# Patient Record
Sex: FEMALE | Race: BLACK OR AFRICAN AMERICAN | Employment: FULL TIME | URBAN - METROPOLITAN AREA
[De-identification: names, ages, dates, MRNs, and addresses within clinical notes are randomized per-mention and may not be internally consistent; named-entity substitution may affect disease eponyms.]

---

## 2021-07-01 ENCOUNTER — APPOINTMENT (OUTPATIENT)
Dept: ULTRASOUND IMAGING | Facility: CLINIC | Age: 26
End: 2021-07-01
Attending: EMERGENCY MEDICINE
Payer: COMMERCIAL

## 2021-07-01 ENCOUNTER — HOSPITAL ENCOUNTER (EMERGENCY)
Facility: CLINIC | Age: 26
Discharge: HOME | End: 2021-07-01
Attending: EMERGENCY MEDICINE | Admitting: EMERGENCY MEDICINE
Payer: COMMERCIAL

## 2021-07-01 ENCOUNTER — HOSPITAL ENCOUNTER (OUTPATIENT)
Facility: CLINIC | Age: 26
Setting detail: OBSERVATION
Discharge: HOME | End: 2021-07-02
Attending: EMERGENCY MEDICINE | Admitting: STUDENT IN AN ORGANIZED HEALTH CARE EDUCATION/TRAINING PROGRAM
Payer: COMMERCIAL

## 2021-07-01 VITALS
HEART RATE: 80 BPM | TEMPERATURE: 99.1 F | SYSTOLIC BLOOD PRESSURE: 136 MMHG | OXYGEN SATURATION: 99 % | BODY MASS INDEX: 28.14 KG/M2 | RESPIRATION RATE: 18 BRPM | HEIGHT: 69 IN | DIASTOLIC BLOOD PRESSURE: 75 MMHG | WEIGHT: 190 LBS

## 2021-07-01 DIAGNOSIS — K59.03 DRUG-INDUCED CONSTIPATION: Primary | ICD-10-CM

## 2021-07-01 DIAGNOSIS — K85.20 ALCOHOL-INDUCED ACUTE PANCREATITIS WITHOUT INFECTION OR NECROSIS: ICD-10-CM

## 2021-07-01 DIAGNOSIS — K85.20 ALCOHOL-INDUCED ACUTE PANCREATITIS, UNSPECIFIED COMPLICATION STATUS: ICD-10-CM

## 2021-07-01 LAB
ALBUMIN SERPL-MCNC: 4 G/DL (ref 3.4–5)
ALBUMIN SERPL-MCNC: 4.1 G/DL (ref 3.4–5)
ALBUMIN UR-MCNC: 100 MG/DL
ALP SERPL-CCNC: 88 U/L (ref 40–150)
ALP SERPL-CCNC: 90 U/L (ref 40–150)
ALT SERPL W P-5'-P-CCNC: 18 U/L (ref 0–50)
ALT SERPL W P-5'-P-CCNC: 18 U/L (ref 0–50)
ANION GAP SERPL CALCULATED.3IONS-SCNC: 6 MMOL/L (ref 3–14)
ANION GAP SERPL CALCULATED.3IONS-SCNC: 9 MMOL/L (ref 3–14)
ANISOCYTOSIS BLD QL SMEAR: SLIGHT
APPEARANCE UR: CLEAR
AST SERPL W P-5'-P-CCNC: 15 U/L (ref 0–45)
AST SERPL W P-5'-P-CCNC: 25 U/L (ref 0–45)
BASOPHILS # BLD AUTO: 0 10E9/L (ref 0–0.2)
BASOPHILS # BLD AUTO: 0.1 10E9/L (ref 0–0.2)
BASOPHILS NFR BLD AUTO: 0.3 %
BASOPHILS NFR BLD AUTO: 1 %
BILIRUB SERPL-MCNC: 0.6 MG/DL (ref 0.2–1.3)
BILIRUB SERPL-MCNC: 0.7 MG/DL (ref 0.2–1.3)
BILIRUB UR QL STRIP: NEGATIVE
BUN SERPL-MCNC: 7 MG/DL (ref 7–30)
BUN SERPL-MCNC: 8 MG/DL (ref 7–30)
CALCIUM SERPL-MCNC: 9.4 MG/DL (ref 8.5–10.1)
CALCIUM SERPL-MCNC: 9.6 MG/DL (ref 8.5–10.1)
CHLORIDE SERPL-SCNC: 104 MMOL/L (ref 94–109)
CHLORIDE SERPL-SCNC: 105 MMOL/L (ref 94–109)
CO2 SERPL-SCNC: 24 MMOL/L (ref 20–32)
CO2 SERPL-SCNC: 26 MMOL/L (ref 20–32)
COLOR UR AUTO: YELLOW
CREAT SERPL-MCNC: 0.54 MG/DL (ref 0.52–1.04)
CREAT SERPL-MCNC: 0.56 MG/DL (ref 0.52–1.04)
DIFFERENTIAL METHOD BLD: ABNORMAL
DIFFERENTIAL METHOD BLD: ABNORMAL
ELLIPTOCYTES BLD QL SMEAR: SLIGHT
EOSINOPHIL # BLD AUTO: 0 10E9/L (ref 0–0.7)
EOSINOPHIL # BLD AUTO: 0 10E9/L (ref 0–0.7)
EOSINOPHIL NFR BLD AUTO: 0 %
EOSINOPHIL NFR BLD AUTO: 0.2 %
ERYTHROCYTE [DISTWIDTH] IN BLOOD BY AUTOMATED COUNT: 18.5 % (ref 10–15)
ERYTHROCYTE [DISTWIDTH] IN BLOOD BY AUTOMATED COUNT: 18.7 % (ref 10–15)
ETHANOL SERPL-MCNC: <0.01 G/DL
GFR SERPL CREATININE-BSD FRML MDRD: >90 ML/MIN/{1.73_M2}
GFR SERPL CREATININE-BSD FRML MDRD: >90 ML/MIN/{1.73_M2}
GLUCOSE SERPL-MCNC: 131 MG/DL (ref 70–99)
GLUCOSE SERPL-MCNC: 155 MG/DL (ref 70–99)
GLUCOSE UR STRIP-MCNC: NEGATIVE MG/DL
HCG SERPL QL: NEGATIVE
HCT VFR BLD AUTO: 34 % (ref 35–47)
HCT VFR BLD AUTO: 34.7 % (ref 35–47)
HGB BLD-MCNC: 9.5 G/DL (ref 11.7–15.7)
HGB BLD-MCNC: 9.6 G/DL (ref 11.7–15.7)
HGB UR QL STRIP: NEGATIVE
IMM GRANULOCYTES # BLD: 0 10E9/L (ref 0–0.4)
IMM GRANULOCYTES NFR BLD: 0.3 %
KETONES UR STRIP-MCNC: NEGATIVE MG/DL
LABORATORY COMMENT REPORT: NORMAL
LEUKOCYTE ESTERASE UR QL STRIP: ABNORMAL
LIPASE SERPL-CCNC: 1627 U/L (ref 73–393)
LIPASE SERPL-CCNC: 2108 U/L (ref 73–393)
LYMPHOCYTES # BLD AUTO: 1.2 10E9/L (ref 0.8–5.3)
LYMPHOCYTES # BLD AUTO: 1.3 10E9/L (ref 0.8–5.3)
LYMPHOCYTES NFR BLD AUTO: 11.4 %
LYMPHOCYTES NFR BLD AUTO: 13 %
MCH RBC QN AUTO: 19.7 PG (ref 26.5–33)
MCH RBC QN AUTO: 20.1 PG (ref 26.5–33)
MCHC RBC AUTO-ENTMCNC: 27.4 G/DL (ref 31.5–36.5)
MCHC RBC AUTO-ENTMCNC: 28.2 G/DL (ref 31.5–36.5)
MCV RBC AUTO: 71 FL (ref 78–100)
MCV RBC AUTO: 72 FL (ref 78–100)
MICROCYTES BLD QL SMEAR: PRESENT
MONOCYTES # BLD AUTO: 0.4 10E9/L (ref 0–1.3)
MONOCYTES # BLD AUTO: 0.8 10E9/L (ref 0–1.3)
MONOCYTES NFR BLD AUTO: 4 %
MONOCYTES NFR BLD AUTO: 7.4 %
MUCOUS THREADS #/AREA URNS LPF: PRESENT /LPF
NEUTROPHILS # BLD AUTO: 8.2 10E9/L (ref 1.6–8.3)
NEUTROPHILS # BLD AUTO: 8.5 10E9/L (ref 1.6–8.3)
NEUTROPHILS NFR BLD AUTO: 80.4 %
NEUTROPHILS NFR BLD AUTO: 82 %
NITRATE UR QL: NEGATIVE
NRBC # BLD AUTO: 0 10*3/UL
NRBC BLD AUTO-RTO: 0 /100
PH UR STRIP: 6 PH (ref 5–7)
PLATELET # BLD AUTO: 270 10E9/L (ref 150–450)
PLATELET # BLD AUTO: 296 10E9/L (ref 150–450)
PLATELET # BLD EST: ABNORMAL 10*3/UL
POTASSIUM SERPL-SCNC: 3.5 MMOL/L (ref 3.4–5.3)
POTASSIUM SERPL-SCNC: 3.6 MMOL/L (ref 3.4–5.3)
PROT SERPL-MCNC: 8.6 G/DL (ref 6.8–8.8)
PROT SERPL-MCNC: 8.9 G/DL (ref 6.8–8.8)
RBC # BLD AUTO: 4.78 10E12/L (ref 3.8–5.2)
RBC # BLD AUTO: 4.82 10E12/L (ref 3.8–5.2)
RBC #/AREA URNS AUTO: 2 /HPF (ref 0–2)
SARS-COV-2 RNA RESP QL NAA+PROBE: NEGATIVE
SODIUM SERPL-SCNC: 136 MMOL/L (ref 133–144)
SODIUM SERPL-SCNC: 138 MMOL/L (ref 133–144)
SOURCE: ABNORMAL
SP GR UR STRIP: 1.02 (ref 1–1.03)
SPECIMEN SOURCE: NORMAL
SQUAMOUS #/AREA URNS AUTO: 14 /HPF (ref 0–1)
TRIGL SERPL-MCNC: 115 MG/DL
UROBILINOGEN UR STRIP-MCNC: 2 MG/DL (ref 0–2)
WBC # BLD AUTO: 10 10E9/L (ref 4–11)
WBC # BLD AUTO: 10.6 10E9/L (ref 4–11)
WBC #/AREA URNS AUTO: 8 /HPF (ref 0–5)

## 2021-07-01 PROCEDURE — 96361 HYDRATE IV INFUSION ADD-ON: CPT

## 2021-07-01 PROCEDURE — 80053 COMPREHEN METABOLIC PANEL: CPT | Performed by: EMERGENCY MEDICINE

## 2021-07-01 PROCEDURE — 250N000011 HC RX IP 250 OP 636: Performed by: STUDENT IN AN ORGANIZED HEALTH CARE EDUCATION/TRAINING PROGRAM

## 2021-07-01 PROCEDURE — 83690 ASSAY OF LIPASE: CPT | Performed by: EMERGENCY MEDICINE

## 2021-07-01 PROCEDURE — 258N000003 HC RX IP 258 OP 636: Performed by: EMERGENCY MEDICINE

## 2021-07-01 PROCEDURE — 99285 EMERGENCY DEPT VISIT HI MDM: CPT | Mod: 25

## 2021-07-01 PROCEDURE — 96376 TX/PRO/DX INJ SAME DRUG ADON: CPT

## 2021-07-01 PROCEDURE — 258N000003 HC RX IP 258 OP 636: Performed by: STUDENT IN AN ORGANIZED HEALTH CARE EDUCATION/TRAINING PROGRAM

## 2021-07-01 PROCEDURE — 85025 COMPLETE CBC W/AUTO DIFF WBC: CPT | Performed by: EMERGENCY MEDICINE

## 2021-07-01 PROCEDURE — 84703 CHORIONIC GONADOTROPIN ASSAY: CPT | Performed by: EMERGENCY MEDICINE

## 2021-07-01 PROCEDURE — 99220 PR INITIAL OBSERVATION CARE,LEVEL III: CPT | Performed by: STUDENT IN AN ORGANIZED HEALTH CARE EDUCATION/TRAINING PROGRAM

## 2021-07-01 PROCEDURE — G0378 HOSPITAL OBSERVATION PER HR: HCPCS

## 2021-07-01 PROCEDURE — 96375 TX/PRO/DX INJ NEW DRUG ADDON: CPT

## 2021-07-01 PROCEDURE — 96374 THER/PROPH/DIAG INJ IV PUSH: CPT

## 2021-07-01 PROCEDURE — C9803 HOPD COVID-19 SPEC COLLECT: HCPCS

## 2021-07-01 PROCEDURE — 84478 ASSAY OF TRIGLYCERIDES: CPT | Performed by: EMERGENCY MEDICINE

## 2021-07-01 PROCEDURE — 81001 URINALYSIS AUTO W/SCOPE: CPT | Performed by: EMERGENCY MEDICINE

## 2021-07-01 PROCEDURE — 87635 SARS-COV-2 COVID-19 AMP PRB: CPT | Performed by: EMERGENCY MEDICINE

## 2021-07-01 PROCEDURE — 250N000011 HC RX IP 250 OP 636: Performed by: EMERGENCY MEDICINE

## 2021-07-01 PROCEDURE — 250N000013 HC RX MED GY IP 250 OP 250 PS 637: Performed by: STUDENT IN AN ORGANIZED HEALTH CARE EDUCATION/TRAINING PROGRAM

## 2021-07-01 PROCEDURE — 76705 ECHO EXAM OF ABDOMEN: CPT

## 2021-07-01 PROCEDURE — 250N000013 HC RX MED GY IP 250 OP 250 PS 637: Performed by: EMERGENCY MEDICINE

## 2021-07-01 PROCEDURE — 82077 ASSAY SPEC XCP UR&BREATH IA: CPT | Performed by: EMERGENCY MEDICINE

## 2021-07-01 RX ORDER — PROCHLORPERAZINE MALEATE 10 MG
10 TABLET ORAL EVERY 6 HOURS PRN
Status: DISCONTINUED | OUTPATIENT
Start: 2021-07-01 | End: 2021-07-02 | Stop reason: HOSPADM

## 2021-07-01 RX ORDER — ACETAMINOPHEN 650 MG/1
650 SUPPOSITORY RECTAL EVERY 4 HOURS PRN
Status: DISCONTINUED | OUTPATIENT
Start: 2021-07-01 | End: 2021-07-02 | Stop reason: HOSPADM

## 2021-07-01 RX ORDER — LANOLIN ALCOHOL/MO/W.PET/CERES
3 CREAM (GRAM) TOPICAL AT BEDTIME
Status: DISCONTINUED | OUTPATIENT
Start: 2021-07-01 | End: 2021-07-02 | Stop reason: HOSPADM

## 2021-07-01 RX ORDER — OXYCODONE HYDROCHLORIDE 5 MG/1
5-10 TABLET ORAL
Status: DISCONTINUED | OUTPATIENT
Start: 2021-07-01 | End: 2021-07-02 | Stop reason: HOSPADM

## 2021-07-01 RX ORDER — ONDANSETRON 4 MG/1
4 TABLET, ORALLY DISINTEGRATING ORAL EVERY 8 HOURS PRN
Qty: 10 TABLET | Refills: 0 | Status: SHIPPED | OUTPATIENT
Start: 2021-07-01 | End: 2021-07-04

## 2021-07-01 RX ORDER — NALOXONE HYDROCHLORIDE 0.4 MG/ML
0.4 INJECTION, SOLUTION INTRAMUSCULAR; INTRAVENOUS; SUBCUTANEOUS
Status: DISCONTINUED | OUTPATIENT
Start: 2021-07-01 | End: 2021-07-02 | Stop reason: HOSPADM

## 2021-07-01 RX ORDER — DIPHENHYDRAMINE HYDROCHLORIDE 50 MG/ML
25 INJECTION INTRAMUSCULAR; INTRAVENOUS ONCE
Status: COMPLETED | OUTPATIENT
Start: 2021-07-01 | End: 2021-07-01

## 2021-07-01 RX ORDER — DIPHENHYDRAMINE HCL 25 MG
25 CAPSULE ORAL ONCE
Status: COMPLETED | OUTPATIENT
Start: 2021-07-01 | End: 2021-07-01

## 2021-07-01 RX ORDER — HYDROMORPHONE HYDROCHLORIDE 1 MG/ML
0.5 INJECTION, SOLUTION INTRAMUSCULAR; INTRAVENOUS; SUBCUTANEOUS ONCE
Status: COMPLETED | OUTPATIENT
Start: 2021-07-01 | End: 2021-07-01

## 2021-07-01 RX ORDER — HYDROMORPHONE HYDROCHLORIDE 1 MG/ML
0.5 INJECTION, SOLUTION INTRAMUSCULAR; INTRAVENOUS; SUBCUTANEOUS
Status: DISCONTINUED | OUTPATIENT
Start: 2021-07-01 | End: 2021-07-01 | Stop reason: HOSPADM

## 2021-07-01 RX ORDER — IBUPROFEN 600 MG/1
600 TABLET, FILM COATED ORAL EVERY 6 HOURS PRN
Status: DISCONTINUED | OUTPATIENT
Start: 2021-07-01 | End: 2021-07-02 | Stop reason: HOSPADM

## 2021-07-01 RX ORDER — HYDROMORPHONE HYDROCHLORIDE 1 MG/ML
.3-.5 INJECTION, SOLUTION INTRAMUSCULAR; INTRAVENOUS; SUBCUTANEOUS
Status: DISCONTINUED | OUTPATIENT
Start: 2021-07-02 | End: 2021-07-02 | Stop reason: HOSPADM

## 2021-07-01 RX ORDER — HYDROXYZINE HYDROCHLORIDE 25 MG/1
50 TABLET, FILM COATED ORAL EVERY 6 HOURS PRN
Status: DISCONTINUED | OUTPATIENT
Start: 2021-07-01 | End: 2021-07-02 | Stop reason: HOSPADM

## 2021-07-01 RX ORDER — ONDANSETRON 2 MG/ML
4 INJECTION INTRAMUSCULAR; INTRAVENOUS ONCE
Status: COMPLETED | OUTPATIENT
Start: 2021-07-01 | End: 2021-07-01

## 2021-07-01 RX ORDER — PANTOPRAZOLE SODIUM 20 MG/1
40 TABLET, DELAYED RELEASE ORAL DAILY
Status: DISCONTINUED | OUTPATIENT
Start: 2021-07-02 | End: 2021-07-02 | Stop reason: HOSPADM

## 2021-07-01 RX ORDER — ONDANSETRON 2 MG/ML
4 INJECTION INTRAMUSCULAR; INTRAVENOUS EVERY 6 HOURS PRN
Status: DISCONTINUED | OUTPATIENT
Start: 2021-07-01 | End: 2021-07-02 | Stop reason: HOSPADM

## 2021-07-01 RX ORDER — NALOXONE HYDROCHLORIDE 0.4 MG/ML
0.2 INJECTION, SOLUTION INTRAMUSCULAR; INTRAVENOUS; SUBCUTANEOUS
Status: DISCONTINUED | OUTPATIENT
Start: 2021-07-01 | End: 2021-07-02 | Stop reason: HOSPADM

## 2021-07-01 RX ORDER — HYDROMORPHONE HYDROCHLORIDE 1 MG/ML
0.5 INJECTION, SOLUTION INTRAMUSCULAR; INTRAVENOUS; SUBCUTANEOUS
Status: DISCONTINUED | OUTPATIENT
Start: 2021-07-01 | End: 2021-07-01

## 2021-07-01 RX ORDER — ONDANSETRON 4 MG/1
4 TABLET, ORALLY DISINTEGRATING ORAL EVERY 6 HOURS PRN
Status: DISCONTINUED | OUTPATIENT
Start: 2021-07-01 | End: 2021-07-02 | Stop reason: HOSPADM

## 2021-07-01 RX ORDER — ONDANSETRON 2 MG/ML
4 INJECTION INTRAMUSCULAR; INTRAVENOUS EVERY 30 MIN PRN
Status: DISCONTINUED | OUTPATIENT
Start: 2021-07-01 | End: 2021-07-01 | Stop reason: HOSPADM

## 2021-07-01 RX ORDER — ACETAMINOPHEN 325 MG/1
650 TABLET ORAL EVERY 4 HOURS PRN
Status: DISCONTINUED | OUTPATIENT
Start: 2021-07-01 | End: 2021-07-02 | Stop reason: HOSPADM

## 2021-07-01 RX ORDER — OXYCODONE HYDROCHLORIDE 5 MG/1
5 TABLET ORAL EVERY 6 HOURS PRN
Qty: 12 TABLET | Refills: 0 | Status: ON HOLD | OUTPATIENT
Start: 2021-07-01 | End: 2021-07-02

## 2021-07-01 RX ORDER — SODIUM CHLORIDE, SODIUM LACTATE, POTASSIUM CHLORIDE, CALCIUM CHLORIDE 600; 310; 30; 20 MG/100ML; MG/100ML; MG/100ML; MG/100ML
INJECTION, SOLUTION INTRAVENOUS CONTINUOUS
Status: DISCONTINUED | OUTPATIENT
Start: 2021-07-01 | End: 2021-07-02 | Stop reason: HOSPADM

## 2021-07-01 RX ORDER — ACETAMINOPHEN 325 MG/1
325-650 TABLET ORAL EVERY 6 HOURS PRN
COMMUNITY

## 2021-07-01 RX ORDER — HYDROXYZINE HYDROCHLORIDE 25 MG/1
25 TABLET, FILM COATED ORAL EVERY 6 HOURS PRN
Status: DISCONTINUED | OUTPATIENT
Start: 2021-07-01 | End: 2021-07-02 | Stop reason: HOSPADM

## 2021-07-01 RX ORDER — PROCHLORPERAZINE 25 MG
25 SUPPOSITORY, RECTAL RECTAL EVERY 12 HOURS PRN
Status: DISCONTINUED | OUTPATIENT
Start: 2021-07-01 | End: 2021-07-02 | Stop reason: HOSPADM

## 2021-07-01 RX ADMIN — MELATONIN 3 MG: 3 TAB ORAL at 21:44

## 2021-07-01 RX ADMIN — HYDROMORPHONE HYDROCHLORIDE 0.5 MG: 1 INJECTION, SOLUTION INTRAMUSCULAR; INTRAVENOUS; SUBCUTANEOUS at 17:27

## 2021-07-01 RX ADMIN — DIPHENHYDRAMINE HYDROCHLORIDE 25 MG: 25 CAPSULE ORAL at 04:42

## 2021-07-01 RX ADMIN — SODIUM CHLORIDE 1000 ML: 9 INJECTION, SOLUTION INTRAVENOUS at 14:15

## 2021-07-01 RX ADMIN — HYDROMORPHONE HYDROCHLORIDE 0.5 MG: 1 INJECTION, SOLUTION INTRAMUSCULAR; INTRAVENOUS; SUBCUTANEOUS at 15:35

## 2021-07-01 RX ADMIN — HYDROMORPHONE HYDROCHLORIDE 0.5 MG: 1 INJECTION, SOLUTION INTRAMUSCULAR; INTRAVENOUS; SUBCUTANEOUS at 21:51

## 2021-07-01 RX ADMIN — SODIUM CHLORIDE, POTASSIUM CHLORIDE, SODIUM LACTATE AND CALCIUM CHLORIDE: 600; 310; 30; 20 INJECTION, SOLUTION INTRAVENOUS at 20:29

## 2021-07-01 RX ADMIN — DIPHENHYDRAMINE HYDROCHLORIDE 25 MG: 50 INJECTION INTRAMUSCULAR; INTRAVENOUS at 03:25

## 2021-07-01 RX ADMIN — ONDANSETRON 4 MG: 2 INJECTION INTRAMUSCULAR; INTRAVENOUS at 12:43

## 2021-07-01 RX ADMIN — IBUPROFEN 600 MG: 600 TABLET ORAL at 21:44

## 2021-07-01 RX ADMIN — HYDROMORPHONE HYDROCHLORIDE 0.5 MG: 1 INJECTION, SOLUTION INTRAMUSCULAR; INTRAVENOUS; SUBCUTANEOUS at 19:34

## 2021-07-01 RX ADMIN — DIPHENHYDRAMINE HYDROCHLORIDE 25 MG: 50 INJECTION INTRAMUSCULAR; INTRAVENOUS at 20:29

## 2021-07-01 RX ADMIN — ONDANSETRON 4 MG: 2 INJECTION INTRAMUSCULAR; INTRAVENOUS at 03:04

## 2021-07-01 RX ADMIN — HYDROMORPHONE HYDROCHLORIDE 1 MG: 1 INJECTION, SOLUTION INTRAMUSCULAR; INTRAVENOUS; SUBCUTANEOUS at 14:17

## 2021-07-01 RX ADMIN — SODIUM CHLORIDE 1000 ML: 9 INJECTION, SOLUTION INTRAVENOUS at 03:04

## 2021-07-01 RX ADMIN — DIPHENHYDRAMINE HYDROCHLORIDE 25 MG: 50 INJECTION INTRAMUSCULAR; INTRAVENOUS at 15:36

## 2021-07-01 RX ADMIN — ONDANSETRON 4 MG: 2 INJECTION INTRAMUSCULAR; INTRAVENOUS at 14:15

## 2021-07-01 RX ADMIN — HYDROMORPHONE HYDROCHLORIDE 0.5 MG: 1 INJECTION, SOLUTION INTRAMUSCULAR; INTRAVENOUS; SUBCUTANEOUS at 04:32

## 2021-07-01 RX ADMIN — HYDROMORPHONE HYDROCHLORIDE 0.5 MG: 1 INJECTION, SOLUTION INTRAMUSCULAR; INTRAVENOUS; SUBCUTANEOUS at 03:05

## 2021-07-01 ASSESSMENT — ENCOUNTER SYMPTOMS
ABDOMINAL PAIN: 1
VOMITING: 1
VOMITING: 1
ABDOMINAL PAIN: 1
CHILLS: 0
NAUSEA: 1
BACK PAIN: 0
APPETITE CHANGE: 1
FEVER: 0
FEVER: 0

## 2021-07-01 ASSESSMENT — MIFFLIN-ST. JEOR
SCORE: 1656.21
SCORE: 1656.21
SCORE: 1693.38

## 2021-07-01 NOTE — PHARMACY-ADMISSION MEDICATION HISTORY
Pharmacy Medication History  Admission medication history interview status for the 7/1/2021  admission is complete. See EPIC admission navigator for prior to admission medications     Location of Interview: Patient room  Medication history sources: Patient    Significant changes made to the medication list:  Added acetaminophen, omeprazole    In the past week, patient estimated taking medication this percent of the time: 50-90% due to illness    Additional medication history information:   None    Medication reconciliation completed by provider prior to medication history? No    Time spent in this activity: 5 minutes    Prior to Admission medications    Medication Sig Last Dose Taking? Auth Provider   acetaminophen (TYLENOL) 325 MG tablet Take 325-650 mg by mouth every 6 hours as needed for mild pain  Yes Unknown, Entered By History   omeprazole (PRILOSEC) 20 MG DR capsule Take 20 mg by mouth daily Past Week at Unknown time Yes Unknown, Entered By History   ondansetron (ZOFRAN ODT) 4 MG ODT tab Take 1 tablet (4 mg) by mouth every 8 hours as needed for nausea  Yes Cordell Bacon MD   oxyCODONE (ROXICODONE) 5 MG tablet Take 1 tablet (5 mg) by mouth every 6 hours as needed for pain  Yes Cordell Bacon MD       The information provided in this note is only as accurate as the sources available at the time of update(s)

## 2021-07-01 NOTE — ED PROVIDER NOTES
"  History   Chief Complaint:  Abdominal pain        The history is provided by the patient.      Chet Mccann is a 26 year old female with history of Pancreatitis secondary to alcohol abuse who presents with abdominal pain. Symptoms have been ongoing for three days. Notes associated nausea, vomiting, and loss of appetite. Denies fever and chills. Symptoms are exacerbated by drinking. She took Tylenol without relief. Notes past similar episodes. Denies possibility of pregnancy.  Denies past medical history of gallbladder problems.    Review of Systems   Constitutional: Positive for appetite change. Negative for chills and fever.   Gastrointestinal: Positive for abdominal pain, nausea and vomiting.   All other systems reviewed and are negative.    Allergies:  Morphine   Fentanyl     Medications:  None     Past Medical History:    Pancreatitis secondary to alcohol abuse    Social History:  Presents alone.  She is from [place] visiting a friend.     Physical Exam     Patient Vitals for the past 24 hrs:   BP Temp Temp src Pulse Resp SpO2 Height Weight   07/01/21 0500 123/78 -- -- 71 -- -- -- --   07/01/21 0447 -- -- -- -- -- 99 % -- --   07/01/21 0430 130/64 -- -- 80 -- -- -- --   07/01/21 0325 -- -- -- -- -- 100 % -- --   07/01/21 0324 127/82 -- -- 78 -- -- -- --   07/01/21 0247 (!) 140/91 99.1  F (37.3  C) Oral 97 18 99 % 1.753 m (5' 9\") 86.2 kg (190 lb)   07/01/21 0245 (!) 140/91 -- -- 97 -- -- -- --       Physical Exam  General: Patient is awake, alert and interactive when I enter the room. Appears uncomfortable  Head: The scalp, face, and head appear normal  Eyes: The pupils are equal, round, and reactive to light. Conjunctivae and sclerae are normal  Neck: Normal range of motion. No anterior cervical lymphadenopathy noted  CV: Regular rate.   Resp: Lungs are clear without wheezes or rales. No respiratory distress.   GI: Abdomen is soft, no rigidity. No evidence of pulsatile mass. No fluid waves or evidence of " ascites. No distension. She is tender to palpation in the epigastric There is no rebound or guarding.    MS: Normal tone. Joints grossly normal without effusions. No asymmetric leg swelling, calf or thigh tenderness.    Skin: No rash or lesions noted. Normal capillary refill noted  Neuro: Speech is normal and fluent. Face is symmetric. Moving all extremities.   Psych:  Normal affect.  Appropriate interactions.      Emergency Department Course     Laboratory:  CBC: WBC 10.0, HGB 9.6 (L) ,    CMP: Glucose 155 (H) Creatinine 0.56 o/w WNL  UA with microscopic: Protein Albumin 100, leukocyte esterase trace, WBC/HPF 8 (H) , squamous epithelial/HPF 14 (H) , mucous present o/w WNL  Lipase: 2,108    HCG quantitative blood: Negative      Emergency Department Course:    Reviewed:  I reviewed nursing notes and vitals    Assessments:  0255 I obtained history and examined the patient as noted above.   0355 I rechecked the patient and explained findings.   0430 Patient still in pain. She is able to tolerate some ice chips.  0530 I rechecked the patient.    Interventions:  0304 Zofran 4 mg IV   0304 NS, 1 L, IV  0305 Dilaudid 0.5mg IV   0325 Benadryl 25mg IV   0432 Dilaudid 0.5mg IV   0442 Benadryl 25mg Oral     Disposition:  The patient was discharged to home.     Impression & Plan     Medical Decision Making:  Chet Mccann is a 26 year old female who presents with epigastric abdominal pain.  The differential diagnosis would include GERD, GIB, esophageal spasm, atypical cardiac sx's, pancreatitis, biliary colic or gallstone disease, AAA, gastroenteritis, gastritis, large vs small bowel disease, etc.  Based on history, PE and labs, the most likely explanation is pancreatitis.      Abdominal exam is benign at this point. Pain improved with the aforementioned interventions. Patient has well known history of pancreatitis, no indication for advanced imaging. Offered admission but she declined. Discussed return precautions.      Covid-19  Chet Mccann was evaluated during a global COVID-19 pandemic, which necessitated consideration that the patient might be at risk for infection with the SARS-CoV-2 virus that causes COVID-19.   Applicable protocols for evaluation were followed during the patient's care.     Diagnosis:    ICD-10-CM    1. Alcohol-induced acute pancreatitis, unspecified complication status  K85.20        Discharge Medications:  New Prescriptions    ONDANSETRON (ZOFRAN ODT) 4 MG ODT TAB    Take 1 tablet (4 mg) by mouth every 8 hours as needed for nausea    OXYCODONE (ROXICODONE) 5 MG TABLET    Take 1 tablet (5 mg) by mouth every 6 hours as needed for pain       Scribe Disclosure:  IArnaldo, am serving as a scribe at 2:44 AM on 7/1/2021 to document services personally performed by  Cordell Bacon MD based on my observations and the provider's statements to me.        Cordell Bacon MD  07/01/21 2159

## 2021-07-01 NOTE — ED NOTES
Pt reports that pain is improved slightly after dilaudid (now 5/10) but does have some itching. Benadryl given. Warm blanket and slippers given to pt.

## 2021-07-01 NOTE — ED TRIAGE NOTES
Patient was discharged from the ER early this am for pancreatitis, she is unable to manage her pain and nausea at home with oxycodone and zofran.

## 2021-07-01 NOTE — ED PROVIDER NOTES
"  History   Chief Complaint:  Abdominal Pain     HPI   Chet Mccann is a 26 year old female with history of pancreatitis secondary to alcohol abuse who presents with 3 days of abdominal pain. The pain does not radiate to her back. She was seen in the ED earlier today and was placed on Zofran and oxycodone and discharged. However, she has been unable to control her pain at home. Her last dose of medications was approximately 5 hours prior to arrival. Since then, she has been unable to manage PO intake due to persistent vomiting. She denies fever and hematemesis. Her last drink was 4 days ago.     Review of Systems   Constitutional: Negative for fever.   Gastrointestinal: Positive for abdominal pain and vomiting.   Musculoskeletal: Negative for back pain.   All other systems reviewed and are negative.    Allergies:  Morphine     Medications:  Zofran  Roxicodone     Past Medical History:    Alcohol abuse  Adrenal hemorrhage  Anemia  Hepatic steatosis  H. Pylori infection  Pancreatitis  GERD     Social History:  Presents to the ED alone   Alcohol use: yes    Physical Exam     Patient Vitals for the past 24 hrs:   BP Temp Pulse Resp SpO2 Height Weight   07/01/21 1231 (!) 142/87 97.7  F (36.5  C) 94 18 100 % 1.753 m (5' 9\") 86.2 kg (190 lb)     Physical Exam  Eyes:               Sclera white; Pupils are equal and round  ENT:                External ears and nares normal  CV:                  Rate as above with regular rhythm   Resp:               Breath sounds clear and equal bilaterally                          Non-labored, no retractions or accessory muscle use  GI:                   Abdomen is soft, epigastric tenderness, doubled over crying in pain                          No rebound tenderness or peritoneal features  MS:                  Moves all extremities  Skin:                Warm and dry  Neuro:             Speech is normal and fluent. No apparent deficit.    Emergency Department Course   Imaging:  US Abdomen " limited RUQ  IMPRESSION:   1.  Mild diffuse fatty infiltration of the liver.   2.  Otherwise unremarkable limited abdominal ultrasound  Reading per radiology    Laboratory:  CBC: WBC 10.6, HGB 9.5 (L),    CMP: glucose 131 (H), protein total 8.9 (H) o/w WNL (Creatinine 0.54)     Triglycerides: 115  Lipase: 1,627 (H)    Alcohol ethyl: <0.01    Asymptomatic COVID19 Virus PCR by nasopharyngeal swab negative     Emergency Department Course:  Reviewed:  I reviewed nursing notes, vitals, past medical history and care everywhere    Assessments:  1356 I obtained history and examined the patient as noted above.   1519 I rechecked the patient and explained findings. She is laying more calmly in bed.      Consults:   1704 I spoke to Dr. Bustos of the hospitalist service.     Interventions:  1243 Zofran, 4 mg, IV  1415 Zofran, 4 mg, IV  1417 Dilaudid, 1 mg, IV  1535 Dilaudid, 0.5 mg, IV  1536 Benadryl, 25 mg, IV  1727 Dilaudid 0.5mg IV injection  1934 Dilaudid 0.5mg IV injection    Disposition:  The patient was admitted to the hospital under the care of Dr. Bustos.     Impression & Plan   Medical Decision Making:  Chet Mccann is a 26 year old female who presents to the emergency department today for evaluation of uncontrolled pain and vomiting after a diagnosis earlier this morning of acute pancreatitis. It has been over 12 hours and blood work was rechecked that shows no developing signs of necrosis, severe electrolyte abnormalities, or sepsis. Ultrasound and triglycerides were not recently checked upon chart review. These both came back normal. She received symptomatic medications and admission was arranged.     Covid-19  Chet Mccann was evaluated during a global COVID-19 pandemic, which necessitated consideration that the patient might be at risk for infection with the SARS-CoV-2 virus that causes COVID-19.   Applicable protocols for evaluation were followed during the patient's care. COVID-19 was considered as part  of the patient's evaluation. The plan for testing is: a test was obtained during this visit.    Diagnosis:    ICD-10-CM    1. Alcohol-induced acute pancreatitis without infection or necrosis  K85.20      Scribe Disclosure:  I, Caroline Hall, am serving as a scribe at 1:55 PM on 7/1/2021 to document services personally performed by Chasidy Justin MD based on my observations and the provider's statements to me.              Chasidy Justin MD  07/01/21 2001

## 2021-07-01 NOTE — H&P
[facility]    History and Physical - Hospitalist Service       Date of Admission:  7/1/2021    Assessment & Plan      Chet Mccann is a 26 year old female with past medical history significant for alcohol use disorder, with prior episodes of pancreatitis, admitted on 7/1/2021 with abdominal pain, found to have acute pancreatitis.     Alcoholic pancreatitis  Pt presents to the ED with 3 days of abdominal pain. She reports associated nausea and vomiting. She has a hx of alcohol induced pancreatitis. Lipase was elevated to 2,108. US of the abdomen was obtained which showed mild diffuse fatty infiltration of the liver, but was otherwise unremarkable.   She attempted to discharge home, but had to return to the ED due to on-going severe pain.   - NPO   - Continue Maintenance fluids   - Pain and nausea control PRN   - Discussed alcohol cessation    Alcohol Use disorder   Pt is vague about how much alcohol she is consumes. She reported drinking some wine and a few shots this weekend. Denies daily drinking. Denies hx of withdrawal.   - Chem dep consultation  - Monitor for signs and symptoms of withdrawal     Hepatic steatosis   Noted on US. LFTs currently wnl    Microcytic Anemia    Hgb is 9.5. This is near her baseline, no active bleeding   - Continue to monitor, outpatient follow-up         Diet: NPO   DVT Prophylaxis: Pneumatic Compression Devices  Denney Catheter: Not present  Central Lines: None  Code Status:Full code     Disposition Plan   Expected discharge: 2 - 3 days, recommended to prior living arrangement once adequate pain management/ tolerating PO medications.     The patient's care was discussed with the Patient.    Antoinette Bustos MD  [facility]  Securely message with the Vocera Web Console (learn more here)  Text page via [employer] Paging/Directory      ______________________________________________________________________    Chief Complaint   Abdominal pain      History is obtained from the patient    History of Present Illness   Chet Mccann is a 26 year old female who presents to the ED with abdominal pain. She was initially seen in the ED early this morning and was diagnosed with pancreatitis. She was sent home with oral pain medications, but continued to have severe pain and could not tolerate the pills so returned to the ED this afternoon. She continues to have severe pain in the epigastrium which radiates to her back. She has not been able to tolerate PO intake. She is restless and tearful due to the pain. She reports drinking some wine and shots this weekend, but denies daily alcohol use. No hx of withdrawal.     Review of Systems    The 10 point Review of Systems is negative other than noted in the HPI or here.     Past Medical History    I have reviewed this patient's medical history and updated it with pertinent information if needed.   Past Medical History:   Diagnosis Date     Alcohol use      Alcohol-induced acute pancreatitis      Anemia      Hepatic steatosis      Obesity        Past Surgical History   I have reviewed this patient's surgical history and updated it with pertinent information if needed.  No past surgical history on file.    Social History   I have reviewed this patient's social history and updated it with pertinent information if needed.  Social History     Tobacco Use     Smoking status: Not on file   Substance Use Topics     Alcohol use: Not on file     Drug use: Not on file       Family History   Reviewed in Care Everywhere, non-contributory     Prior to Admission Medications   Prior to Admission Medications   Prescriptions Last Dose Informant Patient Reported? Taking?   acetaminophen (TYLENOL) 325 MG tablet   Yes Yes   Sig: Take 325-650 mg by mouth every 6 hours as needed for mild pain   omeprazole (PRILOSEC) 20 MG DR capsule Past Week at Unknown time  Yes Yes   Sig: Take 20 mg by mouth daily   ondansetron (ZOFRAN ODT) 4 MG ODT tab   No  Yes   Sig: Take 1 tablet (4 mg) by mouth every 8 hours as needed for nausea   oxyCODONE (ROXICODONE) 5 MG tablet   No Yes   Sig: Take 1 tablet (5 mg) by mouth every 6 hours as needed for pain      Facility-Administered Medications: None     Allergies   Allergies   Allergen Reactions     Morphine Itching       Physical Exam   Vital Signs: Temp: 97.7  F (36.5  C)   BP: (!) 142/87 Pulse: 94   Resp: 18 SpO2: 100 %      Weight: 190 lbs 0 oz  Constitutional: Awake, alert, cooperative, distressed, tearful, writhing around in pain  Eyes: Conjunctiva and pupils examined and normal.  HEENT: Moist mucous membranes, normal dentition.  Respiratory: Clear to auscultation bilaterally, no crackles or wheezing.  Cardiovascular: Regular rate and rhythm, normal S1 and S2, and no murmur noted.  GI: Soft, non-distended, tender in the epigastrium, normal bowel sounds.  Skin: No rashes, no cyanosis, no edema.  Musculoskeletal: No joint swelling, erythema or tenderness.  Neurologic: Cranial nerves 2-12 intact, normal strength and sensation.  Psychiatric: Alert, oriented to person, place and time, tearful, clearly in pain, anxious       Data   Data reviewed today: I reviewed all medications, new labs and imaging results over the last 24 hours.    Recent Labs   Lab 07/01/21  1240 07/01/21  0300   WBC 10.6 10.0   HGB 9.5* 9.6*   MCV 72* 71*    296    136   POTASSIUM 3.6 3.5   CHLORIDE 105 104   CO2 24 26   BUN 7 8   CR 0.54 0.56   ANIONGAP 9 6   JODI 9.4 9.6   * 155*   ALBUMIN 4.1 4.0   PROTTOTAL 8.9* 8.6   BILITOTAL 0.7 0.6   ALKPHOS 88 90   ALT 18 18   AST 25 15   LIPASE 1,627* 2,108*     Recent Results (from the past 24 hour(s))   US Abdomen Limited (RUQ)    Narrative    US ABDOMEN LIMITED 7/1/2021 3:19 PM    CLINICAL HISTORY: Abdominal pain. Pancreatitis.  TECHNIQUE: Limited abdominal ultrasound.  COMPARISON: None.    FINDINGS:  GALLBLADDER: The gallbladder is unremarkable. No gallstones, wall  thickening, or  pericholecystic fluid. Negative sonographic Garcia's  sign.    BILE DUCTS: There is no biliary dilatation. The common duct measures 5  mm.     LIVER: Mild diffuse fatty infiltration of the liver. No focal hepatic  masses.    RIGHT KIDNEY: Unremarkable. No hydronephrosis.    PANCREAS: The visualized portions of the pancreas are unremarkable.    No ascites.      Impression    IMPRESSION:  1.  Mild diffuse fatty infiltration of the liver.  2.  Otherwise unremarkable limited abdominal ultrasound.    JOSH SHIELDS MD          SYSTEM ID:  [id number]

## 2021-07-01 NOTE — ED NOTES
"[facility]  ED Nurse Handoff Report    ED Chief complaint: Abdominal Pain      ED Diagnosis:   Final diagnoses:   None       Code Status: Full Code    Allergies:   Allergies   Allergen Reactions     Morphine Itching       Patient Story: Patient was in ED this am with severe abdominal pain. Discharged home with oxycodone and zofran. Patient returned today because her pain is uncontrolled at home.  Focused Assessment:  Patient states her abdomen hurts.    Treatments and/or interventions provided: Zofran. Dilaudid.  Patient's response to treatments and/or interventions: Some relief.    To be done/followed up on inpatient unit:  See orders.    Does this patient have any cognitive concerns?: None.    Activity level - Baseline/Home:  Independent  Activity Level - Current:   Independent    Patient's Preferred language: English   Needed?: No    Isolation: None  Infection: Not Applicable  Patient tested for COVID 19 prior to admission: YES  Bariatric?: No    Vital Signs:   Vitals:    07/01/21 1231   BP: (!) 142/87   Pulse: 94   Resp: 18   Temp: 97.7  F (36.5  C)   SpO2: 100%   Weight: 86.2 kg (190 lb)   Height: 1.753 m (5' 9\")       Cardiac Rhythm:     Was the PSS-3 completed:   Yes  What interventions are required if any?               Family Comments: N/A  OBS brochure/video discussed/provided to patient/family: No              Name of person given brochure if not patient: N/A              Relationship to patient: N/A    For the majority of the shift this patient's behavior was Green.   Behavioral interventions performed were None.    ED NURSE PHONE NUMBER: [phone]         "

## 2021-07-02 VITALS
SYSTOLIC BLOOD PRESSURE: 135 MMHG | HEART RATE: 71 BPM | BODY MASS INDEX: 29.36 KG/M2 | WEIGHT: 198.19 LBS | OXYGEN SATURATION: 100 % | TEMPERATURE: 97.7 F | HEIGHT: 69 IN | RESPIRATION RATE: 18 BRPM | DIASTOLIC BLOOD PRESSURE: 82 MMHG

## 2021-07-02 LAB
ANION GAP SERPL CALCULATED.3IONS-SCNC: 7 MMOL/L (ref 3–14)
BUN SERPL-MCNC: 5 MG/DL (ref 7–30)
CALCIUM SERPL-MCNC: 8.9 MG/DL (ref 8.5–10.1)
CHLORIDE SERPL-SCNC: 105 MMOL/L (ref 94–109)
CO2 SERPL-SCNC: 24 MMOL/L (ref 20–32)
CREAT SERPL-MCNC: 0.62 MG/DL (ref 0.52–1.04)
GFR SERPL CREATININE-BSD FRML MDRD: >90 ML/MIN/{1.73_M2}
GLUCOSE SERPL-MCNC: 97 MG/DL (ref 70–99)
LIPASE SERPL-CCNC: 467 U/L (ref 73–393)
POTASSIUM SERPL-SCNC: 3.6 MMOL/L (ref 3.4–5.3)
SODIUM SERPL-SCNC: 136 MMOL/L (ref 133–144)
TRIGL SERPL-MCNC: 112 MG/DL

## 2021-07-02 PROCEDURE — 258N000003 HC RX IP 258 OP 636: Performed by: STUDENT IN AN ORGANIZED HEALTH CARE EDUCATION/TRAINING PROGRAM

## 2021-07-02 PROCEDURE — 83690 ASSAY OF LIPASE: CPT | Performed by: STUDENT IN AN ORGANIZED HEALTH CARE EDUCATION/TRAINING PROGRAM

## 2021-07-02 PROCEDURE — G0378 HOSPITAL OBSERVATION PER HR: HCPCS

## 2021-07-02 PROCEDURE — 99217 PR OBSERVATION CARE DISCHARGE: CPT | Performed by: INTERNAL MEDICINE

## 2021-07-02 PROCEDURE — 84478 ASSAY OF TRIGLYCERIDES: CPT | Performed by: STUDENT IN AN ORGANIZED HEALTH CARE EDUCATION/TRAINING PROGRAM

## 2021-07-02 PROCEDURE — 96376 TX/PRO/DX INJ SAME DRUG ADON: CPT

## 2021-07-02 PROCEDURE — 36415 COLL VENOUS BLD VENIPUNCTURE: CPT | Performed by: STUDENT IN AN ORGANIZED HEALTH CARE EDUCATION/TRAINING PROGRAM

## 2021-07-02 PROCEDURE — 250N000013 HC RX MED GY IP 250 OP 250 PS 637: Performed by: STUDENT IN AN ORGANIZED HEALTH CARE EDUCATION/TRAINING PROGRAM

## 2021-07-02 PROCEDURE — 99207 PR APP CREDIT; MD BILLING SHARED VISIT: CPT | Performed by: PHYSICIAN ASSISTANT

## 2021-07-02 PROCEDURE — 250N000011 HC RX IP 250 OP 636: Performed by: STUDENT IN AN ORGANIZED HEALTH CARE EDUCATION/TRAINING PROGRAM

## 2021-07-02 PROCEDURE — 80048 BASIC METABOLIC PNL TOTAL CA: CPT | Performed by: STUDENT IN AN ORGANIZED HEALTH CARE EDUCATION/TRAINING PROGRAM

## 2021-07-02 RX ORDER — AMOXICILLIN 250 MG
1 CAPSULE ORAL DAILY
Qty: 30 TABLET | Refills: 0 | Status: SHIPPED | OUTPATIENT
Start: 2021-07-02 | End: 2021-07-02

## 2021-07-02 RX ORDER — OXYCODONE HYDROCHLORIDE 5 MG/1
5 TABLET ORAL EVERY 4 HOURS PRN
Qty: 8 TABLET | Refills: 0 | Status: SHIPPED | OUTPATIENT
Start: 2021-07-02

## 2021-07-02 RX ORDER — IBUPROFEN 600 MG/1
600 TABLET, FILM COATED ORAL EVERY 6 HOURS PRN
COMMUNITY
Start: 2021-07-02

## 2021-07-02 RX ORDER — AMOXICILLIN 250 MG
1 CAPSULE ORAL DAILY
Qty: 30 TABLET | Refills: 0 | Status: SHIPPED | OUTPATIENT
Start: 2021-07-02

## 2021-07-02 RX ADMIN — PANTOPRAZOLE SODIUM 40 MG: 20 TABLET, DELAYED RELEASE ORAL at 07:52

## 2021-07-02 RX ADMIN — OXYCODONE HYDROCHLORIDE 5 MG: 5 TABLET ORAL at 08:21

## 2021-07-02 RX ADMIN — IBUPROFEN 600 MG: 600 TABLET ORAL at 04:20

## 2021-07-02 RX ADMIN — OXYCODONE HYDROCHLORIDE 10 MG: 5 TABLET ORAL at 03:08

## 2021-07-02 RX ADMIN — SODIUM CHLORIDE, POTASSIUM CHLORIDE, SODIUM LACTATE AND CALCIUM CHLORIDE: 600; 310; 30; 20 INJECTION, SOLUTION INTRAVENOUS at 04:17

## 2021-07-02 RX ADMIN — HYDROMORPHONE HYDROCHLORIDE 0.5 MG: 1 INJECTION, SOLUTION INTRAMUSCULAR; INTRAVENOUS; SUBCUTANEOUS at 01:10

## 2021-07-02 RX ADMIN — IBUPROFEN 600 MG: 600 TABLET ORAL at 10:22

## 2021-07-02 NOTE — PROGRESS NOTES
Observation goals PRIOR TO DISCHARGE    Comments:   -diagnostic tests and consults completed and resulted -Partially met  -vital signs normal or at patient baseline-met   -tolerating oral intake to maintain hydration -Not met  -adequate pain control on oral analgesics -Partially met  -returns to baseline functional status -Partially met  -safe disposition plan has been identified -Not met  Nurse to notify provider when observation goals have been met and patient is ready for discharge.

## 2021-07-02 NOTE — PLAN OF CARE
Pt A&O X4. VSS on RA. Up SBA/IND. NPO except for ice chips and Meds. IVF LR @125 ML/HR. Abdominal pain managed with PRN dilaudid and ibuprofen. Possible discharge 2-3 days once adequate pain managed  with PO medications. Continue to monitor.     Observation goals PRIOR TO DISCHARGE    Comments:   -diagnostic tests and consults completed and resulted -Partially met  -vital signs normal or at patient baseline-met   -tolerating oral intake to maintain hydration -Not met  -adequate pain control on oral analgesics -Partially met  -returns to baseline functional status -Partially met  -safe disposition plan has been identified -Not met  Nurse to notify provider when observation goals have been met and patient is ready for discharge.

## 2021-07-02 NOTE — PROVIDER NOTIFICATION
MD Notification    Notified Person: MD    Notified Person Name: Dr. Akash Curry    Notification Date/Time:    Notification Interaction:    Purpose of Notification: OBS 29 SG  pt is requesting IV BENADRYL  Please advise  Thank you  Susan ROMERO    Orders Received:    Comments:

## 2021-07-02 NOTE — PROGRESS NOTES
Observation goals PRIOR TO DISCHARGE      -diagnostic tests and consults completed and resulted -Partially met  -vital signs normal or at patient baseline-met   -tolerating oral intake to maintain hydration -Not met  -adequate pain control on oral analgesics - Met  -returns to baseline functional status - Met  -safe disposition plan has been identified -Not met    Nurse to notify provider when observation goals have been met and patient is ready for discharge.

## 2021-07-02 NOTE — DISCHARGE SUMMARY
[facility]  Hospitalist Discharge Summary      Date of Admission:  7/1/2021  Date of Discharge:  7/2/2021  Discharging Provider: Bella Abdul PA-C    Discharge Diagnoses   Alcoholic pancreatitis     Follow-ups Needed After Discharge   Follow-up Appointments     Follow-up and recommended labs and tests       Follow up with primary care provider, Physician No Ref-Primary, within 7   days for hospital follow- up.  The following labs/tests are recommended:   hemoglobin, outpatient anemia work-up.           Unresulted Labs Ordered in the Past 30 Days of this Admission     No orders found for last 31 day(s).      These results will be followed up by PCP    Discharge Disposition   Discharged to home  Condition at discharge: Stable    Hospital Course   Chet Mccann is a 26 year old female with past medical history significant for alcohol use disorder, with prior episodes of pancreatitis, admitted on 7/1/2021 with abdominal pain, found to have acute pancreatitis.      Alcoholic pancreatitis  Pt presented to the ED with 3 days of abdominal pain. She reports associated nausea and vomiting. She has a hx of alcohol induced pancreatitis. Lipase was elevated to 2,108. US of the abdomen was obtained which showed mild diffuse fatty infiltration of the liver, but was otherwise unremarkable.   She attempted to discharge home, but had to return to the ED due to on-going severe pain.   * Hydrated with IVF and treated with analgesics and antiemetics. Diet was advanced prior to discharge and pt wished to discharge as she was traveling back home to [place].   - Continue tylenol, ibuprofen and oxycodone for pain control   - Continue PTA Zofran   - Advance diet as tolerated, recommend pulling back on diet with worsening pain   - Close PCP follow-up at discharge  - Alcohol cessation strongly encouraged   - Seek medical attention with worsening pain, fevers, or inability to tolerate oral intake       Alcohol Use disorder   Pt is vague about how much alcohol she is consumes. She reported drinking some wine and a few shots this weekend. Denies daily drinking. Denies hx of withdrawal.   * No signs of withdrawal during hospital stay.      Hepatic steatosis   Noted on US. LFTs currently wnl     Microcytic Anemia    Hgb is 9.5. This is near her baseline, no active bleeding. Reports heavy periods soaking through 3-4 pads daily, lasting for 1 week. LMP ended last week. No melena, hematochezia, hematuria. No dizziness, lightheadedness, chest pain or SOB.   - Outpatient PCP follow-up for outpatient anemia work-up. Discussed iron supplementation, but would hold off on starting given potential adverse GI effects in the context of recovery from acute pancreatitis     Consultations This Hospital Stay   None    Code Status   Full Code    Time Spent on this Encounter   I, Bella Abdul PA-C, personally saw the patient today and spent greater than 30 minutes discharging this patient.       Bella Abdul PA-C  [facility]  [address] [place] [id number]  Phone: [phone]  ______________________________________________________________________    Physical Exam   Vital Signs: Temp: 97.7  F (36.5  C) Temp src: Oral BP: 135/82 Pulse: 71   Resp: 18 SpO2: 100 % O2 Device: None (Room air)    Weight: 198 lbs 3.1 oz    CONSTITUTIONAL: Pt laying in bed, dressed in hospital garb. Appears comfortable. Cooperative with interview.   HEENT: Normocephalic, atraumatic.   CARDIOVASCULAR: RRR, no murmurs, rubs, or extra heart sounds appreciated. Pulses +2/4 and regular in upper and lower extremities, bilaterally.   RESPIRATORY: No increased work of breathing. CTA, bilat; no wheezes, rales, or rhonchi appreciated.  GASTROINTESTINAL:  Abdomen soft, non-distended. BS auscultated in all four quadrants. Some tenderness to palpation of epigastric region.  No masses  or organomegaly noted.  MUSCULOSKELETAL: No gross deformities noted. Normal muscle tone.   HEMATOLOGIC/LYMPHATIC/IMMUNOLOGIC: Negative for lower extremity edema, bilaterally.  NEUROLOGIC: Alert and oriented to person, place, and time.  strength intact. No focal neuro deficits.   SKIN: Warm, dry, intact. No jaundice noted. Negative for suspicious lesions, rashes, bruising, open sores or abrasions.        Primary Care Physician   Physician No Ref-Primary    Discharge Orders      Reason for your hospital stay    You were hospitalized for further evaluation and treatment of acute pancreatitis, likely alcohol related.     Follow-up and recommended labs and tests     Follow up with primary care provider, Physician No Ref-Primary, within 7 days for hospital follow- up.  The following labs/tests are recommended: hemoglobin, outpatient anemia work-up.     Activity    Your activity upon discharge: activity as tolerated     Discharge Instructions    1) Pain control with tylenol, ibuprofen and oxycodone   2) Utilize your prior zofran prescription for nausea   3) Advance diet as tolerated   4) Close PCP follow-up for hospital follow-up and outpatient anemia work-up     Diet    Follow this diet upon discharge: Clear liquid diet, slowly advance as tolerated, but if you have worsening pain, need to downgrade diet back to clear liquids vs no oral intake.       Significant Results and Procedures   Most Recent 3 CBC's:  Recent Labs   Lab Test 07/01/21  1240 07/01/21  0300   WBC 10.6 10.0   HGB 9.5* 9.6*   MCV 72* 71*    296     Most Recent 3 BMP's:  Recent Labs   Lab Test 07/02/21  0603 07/01/21  1240 07/01/21  0300    138 136   POTASSIUM 3.6 3.6 3.5   CHLORIDE 105 105 104   CO2 24 24 26   BUN 5* 7 8   CR 0.62 0.54 0.56   ANIONGAP 7 9 6   JODI 8.9 9.4 9.6   GLC 97 131* 155*     Most Recent 2 LFT's:  Recent Labs   Lab Test 07/01/21  1240 07/01/21  0300   AST 25 15   ALT 18 18   ALKPHOS 88 90   BILITOTAL 0.7 0.6     Most  Recent Cholesterol Panel:  Recent Labs   Lab Test 07/02/21  0603   TRIG 112   ,   Results for orders placed or performed during the hospital encounter of 07/01/21   US Abdomen Limited (RUQ)    Narrative    US ABDOMEN LIMITED 7/1/2021 3:19 PM    CLINICAL HISTORY: Abdominal pain. Pancreatitis.  TECHNIQUE: Limited abdominal ultrasound.  COMPARISON: None.    FINDINGS:  GALLBLADDER: The gallbladder is unremarkable. No gallstones, wall  thickening, or pericholecystic fluid. Negative sonographic Garcia's  sign.    BILE DUCTS: There is no biliary dilatation. The common duct measures 5  mm.     LIVER: Mild diffuse fatty infiltration of the liver. No focal hepatic  masses.    RIGHT KIDNEY: Unremarkable. No hydronephrosis.    PANCREAS: The visualized portions of the pancreas are unremarkable.    No ascites.      Impression    IMPRESSION:  1.  Mild diffuse fatty infiltration of the liver.  2.  Otherwise unremarkable limited abdominal ultrasound.    JOSH SHIELDS MD          SYSTEM ID:  [id number]       Discharge Medications   Discharge Medication List as of 7/2/2021 10:47 AM      START taking these medications    Details   ibuprofen (ADVIL/MOTRIN) 600 MG tablet Take 1 tablet (600 mg) by mouth every 6 hours as needed for other (mild pain), OTC         CONTINUE these medications which have CHANGED    Details   oxyCODONE (ROXICODONE) 5 MG tablet Take 1 tablet (5 mg) by mouth every 4 hours as needed for pain, Disp-8 tablet, R-0, Local Print      senna-docusate (SENOKOT-S/PERICOLACE) 8.6-50 MG tablet Take 1 tablet by mouth daily, Disp-30 tablet, R-0, Local Print         CONTINUE these medications which have NOT CHANGED    Details   acetaminophen (TYLENOL) 325 MG tablet Take 325-650 mg by mouth every 6 hours as needed for mild pain, Historical      omeprazole (PRILOSEC) 20 MG DR capsule Take 20 mg by mouth daily, Historical      ondansetron (ZOFRAN ODT) 4 MG ODT tab Take 1 tablet (4 mg) by mouth every 8 hours as needed for nausea,  Disp-10 tablet, R-0, Local Print           Allergies   Allergies   Allergen Reactions     Morphine Itching

## 2021-07-02 NOTE — PROGRESS NOTES
RECEIVING UNIT ED HANDOFF REVIEW    ED Nurse Handoff Report was reviewed by: Gab Vega RN on July 1, 2021 at 7:31 PM

## 2021-07-02 NOTE — PROVIDER NOTIFICATION
MD Notification    Notified Person: MD    Notified Person Name: Franko    Notification Date/Time: 7/2/21 at 1008    Notification Interaction: Web page    Purpose of Notification: FYI: Discharge medications cannot be filled here due to insurance. Do you want a script sent with patient for the senna? Thanks!     Orders Received:    Comments:

## 2021-07-02 NOTE — PLAN OF CARE
Discharge instructions discussed and questions answered. Discharge medication scripts sent with patient.